# Patient Record
Sex: MALE | Race: OTHER | Employment: FULL TIME | URBAN - METROPOLITAN AREA
[De-identification: names, ages, dates, MRNs, and addresses within clinical notes are randomized per-mention and may not be internally consistent; named-entity substitution may affect disease eponyms.]

---

## 2023-08-20 ENCOUNTER — HOSPITAL ENCOUNTER (EMERGENCY)
Age: 38
Discharge: HOME | End: 2023-08-20
Payer: COMMERCIAL

## 2023-08-20 VITALS
DIASTOLIC BLOOD PRESSURE: 90 MMHG | OXYGEN SATURATION: 98 % | WEIGHT: 210 LBS | RESPIRATION RATE: 16 BRPM | TEMPERATURE: 98 F | BODY MASS INDEX: 29.4 KG/M2 | SYSTOLIC BLOOD PRESSURE: 138 MMHG | HEIGHT: 71 IN | HEART RATE: 65 BPM

## 2023-08-20 DIAGNOSIS — M79.10 MYALGIA: Primary | ICD-10-CM

## 2023-08-20 PROCEDURE — 99283 EMERGENCY DEPT VISIT LOW MDM: CPT

## 2023-08-20 PROCEDURE — 99281 EMR DPT VST MAYX REQ PHY/QHP: CPT

## 2023-08-20 NOTE — ED INITIAL ASSESSMENT (HPI)
Sent home from work Friday- states he was just sore and tired from working out Friday.  States he just needs a work note to return to work

## (undated) NOTE — LETTER
Date & Time: 8/20/2023, 11:23 AM  Patient: Cristy   Encounter Provider(s):    John Nassar PA-C       To Whom It May Concern:    Cristy  was seen and treated in our department on 8/20/2023. He can return to work on 8/21/23 with no restrictions. If you have any questions or concerns, please do not hesitate to call.       Yoseph Mcintosh PA-C  _____________________________  [id number]/IOM Signature